# Patient Record
Sex: FEMALE | Race: BLACK OR AFRICAN AMERICAN
[De-identification: names, ages, dates, MRNs, and addresses within clinical notes are randomized per-mention and may not be internally consistent; named-entity substitution may affect disease eponyms.]

---

## 2019-07-22 ENCOUNTER — HOSPITAL ENCOUNTER (OUTPATIENT)
Dept: HOSPITAL 92 - BICRAD | Age: 84
Discharge: HOME | End: 2019-07-22
Attending: FAMILY MEDICINE
Payer: MEDICARE

## 2019-07-22 DIAGNOSIS — M43.16: ICD-10-CM

## 2019-07-22 DIAGNOSIS — M25.78: ICD-10-CM

## 2019-07-22 DIAGNOSIS — M47.816: ICD-10-CM

## 2019-07-22 DIAGNOSIS — M54.5: Primary | ICD-10-CM

## 2019-07-22 PROCEDURE — 72100 X-RAY EXAM L-S SPINE 2/3 VWS: CPT

## 2019-07-22 NOTE — RAD
LUMBAR SPINE TWO VIEWS:

 

HISTORY:

Low back pain.

 

FINDINGS:

Extensive multilevel disk osteophytosis and facet arthrosis.  Mild grade 1 anterolisthesis of L3 on L
4.  Some nonspecific calcific foci noted anteriorly, on the lateral view, possibly intraabdominal selina
cifications or fibroid calcifications.

 

IMPRESSION:

Extensive lumbar disk osteophytosis and facet arthrosis, with mild grade 1 anterolisthesis of L3 on L
4.

 

POS: [facility]

## 2019-08-19 ENCOUNTER — HOSPITAL ENCOUNTER (OUTPATIENT)
Dept: HOSPITAL 92 - BICMRI | Age: 84
Discharge: HOME | End: 2019-08-19
Attending: FAMILY MEDICINE
Payer: MEDICARE

## 2019-08-19 DIAGNOSIS — M48.061: Primary | ICD-10-CM

## 2019-08-19 DIAGNOSIS — M47.816: ICD-10-CM

## 2019-08-19 PROCEDURE — 72148 MRI LUMBAR SPINE W/O DYE: CPT

## 2019-08-19 NOTE — MRI
MRI lumbar spine noncontrast:



HISTORY:

Degenerative spinal stenosis



COMPARISON:

None



FINDINGS:



Heterogeneous T1 marrow signal intensity of the lumbar vertebra. Vertebral body height is maintained.
 No fracture. Chronic Schmorl's node along the superior endplate of L2, superior endplate of L3 and

superior plate of L4. Small Schmorl's node along the superior endplate of T12.

4 mm of anterolisthesis of L3 upon L4

Symmetric signal intensity of the paraspinal muscles. Appropriate signal intensity of the visualized 
solid organs

Conus medullaris terminates at the L1-L2 disc space



T12-L1:Broad-based disc bulge, ligament flavum thickening and facet hypertrophy result in mild centra
l canal stenosis. Mild bilateral neural foraminal narrowing.



L1-L2:Broad-based disc bulge, ligament flavum thickening and facet hypertrophy result in mild central
 canal stenosis. Bilaterally, neural foramina are patent.



L2-L3:Broad-based disc bulge, ligament flavum thickening and facet hypertrophy result in mild to mode
rate central canal stenosis. Right neural foramen is patent. Mild left neural foraminal narrowing.



L3-L4:Broad-based disc bulge with a right subarticular component. Mass effect and partial obscuration
 traversing right L4 nerve root. Ligament flavum thickening and facet hypertrophy are present.

Moderate central canal stenosis. Moderate right and severe left neural foraminal narrowing. There gerda
ears to be disc material in the left neural foramen obscuring the foraminal left L3 nerve root.



L4-L5:Desiccation with moderate loss of disc space height. Broad-based disc bulge abuts and flattens 
the ventral thecal sac. Ligament flavum thickening and facet hypertrophy are present. Mild central

canal stenosis. Mild right and mild-to-moderate left neural foraminal narrowing.



L5-S1:Desiccation with mild loss of disc space height. Broad-based disc bulge abuts the ventral theca
l sac. Disc material abuts but does not obscure the traversing S1 nerve root. Ligament flavum

thickening and facet hypertrophy are present. Moderate right and mild left neural foraminal narrowing
.



IMPRESSION:

Degenerative changes of the lumbar spine as detailed above. Moderate central canal stenosis at L3-L4.
 Severe left neural foraminal narrowing at L3-L4 due to disc material. There is obscuration the

foraminal left L3 nerve root.



Reported By: Malik Ibanez 

Electronically Signed:  8/19/2019 1:47 PM

## 2020-11-18 ENCOUNTER — HOSPITAL ENCOUNTER (OUTPATIENT)
Dept: HOSPITAL 92 - BICRAD | Age: 85
Discharge: HOME | End: 2020-11-18
Attending: FAMILY MEDICINE
Payer: MEDICARE

## 2020-11-18 DIAGNOSIS — M19.011: Primary | ICD-10-CM

## 2020-11-18 DIAGNOSIS — M19.012: ICD-10-CM

## 2020-11-18 NOTE — RAD
RIGHT SHOULDER THREE VIEW:

11/18/20

 

HISTORY: 

Shoulder arthritis. 

 

COMPARISON:  

None. 

 

FINDINGS: 

There are large osteophytes of the right humeral head and neck junction. Large glenoid osteophytes. 

 

Ribs are intact. Moderate narrowing of the glenohumeral joint. 

 

IMPRESSION: 

Advanced degenerative change. 

 

POS: [facility]

## 2020-11-18 NOTE — RAD
LEFT SHOULDER 3 VIEWS:

 

HISTORY: 

Shoulder arthritis.

 

COMPARISON: 

None.

 

FINDINGS: 

Large osteophytes of the humeral head and glenoid.  Moderate degenerative disease of the acromioclavi
cular joint.  No acute fracture or malalignment.

 

IMPRESSION: 

Moderate advanced degenerative change of the left shoulder.

 

POS: [facility]